# Patient Record
Sex: FEMALE | URBAN - NONMETROPOLITAN AREA
[De-identification: names, ages, dates, MRNs, and addresses within clinical notes are randomized per-mention and may not be internally consistent; named-entity substitution may affect disease eponyms.]

---

## 2023-07-27 ENCOUNTER — OUT OF OFFICE VISIT (OUTPATIENT)
Dept: URBAN - NONMETROPOLITAN AREA MEDICAL CENTER 3 | Facility: MEDICAL CENTER | Age: 78
End: 2023-07-27
Payer: MEDICARE

## 2023-07-27 DIAGNOSIS — K57.33 DIVERTICULITIS LARGE INTESTINE W/O PERFORATION OR ABSCESS W/BLEEDING: ICD-10-CM

## 2023-07-27 DIAGNOSIS — Z79.01 ADEQUATE ANTICOAGULATION ON ANTICOAGULANT THERAPY: ICD-10-CM

## 2023-07-27 PROCEDURE — 99222 1ST HOSP IP/OBS MODERATE 55: CPT | Performed by: PHYSICIAN ASSISTANT

## 2023-07-27 PROCEDURE — 99232 SBSQ HOSP IP/OBS MODERATE 35: CPT | Performed by: PHYSICIAN ASSISTANT

## 2023-07-27 PROCEDURE — G8427 DOCREV CUR MEDS BY ELIG CLIN: HCPCS | Performed by: PHYSICIAN ASSISTANT

## 2023-08-15 ENCOUNTER — WEB ENCOUNTER (OUTPATIENT)
Dept: URBAN - NONMETROPOLITAN AREA CLINIC 4 | Facility: CLINIC | Age: 78
End: 2023-08-15

## 2023-08-18 ENCOUNTER — DASHBOARD ENCOUNTERS (OUTPATIENT)
Age: 78
End: 2023-08-18

## 2023-08-18 ENCOUNTER — OFFICE VISIT (OUTPATIENT)
Dept: URBAN - NONMETROPOLITAN AREA CLINIC 4 | Facility: CLINIC | Age: 78
End: 2023-08-18
Payer: MEDICARE

## 2023-08-18 VITALS
HEART RATE: 76 BPM | TEMPERATURE: 97.5 F | SYSTOLIC BLOOD PRESSURE: 139 MMHG | WEIGHT: 163.4 LBS | HEIGHT: 62 IN | BODY MASS INDEX: 30.07 KG/M2 | DIASTOLIC BLOOD PRESSURE: 78 MMHG

## 2023-08-18 DIAGNOSIS — R10.32 LLQ ABDOMINAL PAIN: ICD-10-CM

## 2023-08-18 DIAGNOSIS — K55.9 ISCHEMIC COLITIS: ICD-10-CM

## 2023-08-18 DIAGNOSIS — K62.5 RECTAL BLEEDING: ICD-10-CM

## 2023-08-18 DIAGNOSIS — D46.C MDS (MYELODYSPLASTIC SYNDROME) WITH 5Q DELETION: ICD-10-CM

## 2023-08-18 PROBLEM — 301716002: Status: ACTIVE | Noted: 2023-08-18

## 2023-08-18 PROBLEM — 12063002: Status: ACTIVE | Noted: 2023-08-18

## 2023-08-18 PROBLEM — 277597005: Status: ACTIVE | Noted: 2023-08-18

## 2023-08-18 PROBLEM — 30588004: Status: ACTIVE | Noted: 2023-08-18

## 2023-08-18 PROCEDURE — 99244 OFF/OP CNSLTJ NEW/EST MOD 40: CPT | Performed by: PHYSICIAN ASSISTANT

## 2023-08-18 PROCEDURE — 99204 OFFICE O/P NEW MOD 45 MIN: CPT | Performed by: PHYSICIAN ASSISTANT

## 2023-08-18 RX ORDER — LISINOPRIL 20 MG/1
TABLET ORAL
Qty: 90 TABLET | Status: ACTIVE | COMMUNITY

## 2023-08-18 RX ORDER — CALCITRIOL 0.25 UG/1
CAPSULE, LIQUID FILLED ORAL
Qty: 39 CAPSULE | Status: ACTIVE | COMMUNITY

## 2023-08-18 RX ORDER — INSULIN ASPART 100 [IU]/ML
INJECTION, SOLUTION INTRAVENOUS; SUBCUTANEOUS
Qty: 30 MILLILITER | Status: ACTIVE | COMMUNITY

## 2023-08-18 RX ORDER — SODIUM, POTASSIUM,MAG SULFATES 17.5-3.13G
177ML SOLUTION, RECONSTITUTED, ORAL ORAL
Qty: 1 | OUTPATIENT
Start: 2023-08-18 | End: 2023-08-20

## 2023-08-18 RX ORDER — ATORVASTATIN CALCIUM 20 MG/1
TABLET, FILM COATED ORAL
Qty: 90 TABLET | Status: ACTIVE | COMMUNITY

## 2023-08-18 RX ORDER — APIXABAN 5 MG/1
TABLET, FILM COATED ORAL
Qty: 60 TABLET | Status: ACTIVE | COMMUNITY

## 2023-08-18 RX ORDER — COPPER GLUCONATE 2 MG
AS DIRECTED TABLET ORAL
Status: ACTIVE | COMMUNITY

## 2023-08-18 RX ORDER — PANTOPRAZOLE SODIUM 40 MG/1
1 TABLET TABLET, DELAYED RELEASE ORAL ONCE A DAY
Status: ACTIVE | COMMUNITY

## 2023-08-18 RX ORDER — EAR PLUGS
1000MCG/ML EACH OTIC (EAR)
Status: ACTIVE | COMMUNITY

## 2023-08-18 RX ORDER — ELECTROLYTES/DEXTROSE
1 TABLET SOLUTION, ORAL ORAL ONCE A DAY
Status: ACTIVE | COMMUNITY

## 2023-08-18 RX ORDER — CHOLECALCIFEROL (VITAMIN D3) 1250 MCG
1 CAPSULE CAPSULE ORAL
Status: ACTIVE | COMMUNITY

## 2023-08-18 NOTE — PHYSICAL EXAM CONSTITUTIONAL:
normal,  alert,  pleasant, well nourished, in no acute distress,  well developed, well nourished,  ambulating with a walker

## 2023-08-18 NOTE — HPI-TODAY'S VISIT:
Michelle Farah is a 77-year-old female patient with PMH of DM, CKD, HTN, GERD, history of DVT on Eliquis, MDS followed by Dr. Moses, who recently was at [facility] on 7/26 for abdominal pain and rectal bleeding.     Initial lab work shows WBC 7.1, hemoglobin 8.5, MCV 90.9, platelets 305k.CT A/P shows: 1.  There is moderate wall thickening involving the mid sigmoid colon with intermixed diverticula.  Adjacent fat stranding within the sigmoid mesocolon noted.  Findings consistent with segmental diverticulitis.   2.  No free peritoneal air nor free fluid is evident.  She presents today for follow up, continues to have some discomfort intermittently to the left lower quadrant but ultimately denies any significant pain like prior to her presentation and she also denies any further rectal bleeding  an extensive review of her hospital chart her hemoglobin was 9 in platelets with 302,000  she tells me that she recently prior to hospitalization received a blood transfusion because her hemoglobin was down to six secondary to her myelodysplastic syndrome

## 2023-09-13 ENCOUNTER — TELEPHONE ENCOUNTER (OUTPATIENT)
Dept: URBAN - METROPOLITAN AREA CLINIC 54 | Facility: CLINIC | Age: 78
End: 2023-09-13

## 2023-11-08 ENCOUNTER — TELEPHONE ENCOUNTER (OUTPATIENT)
Dept: URBAN - METROPOLITAN AREA CLINIC 54 | Facility: CLINIC | Age: 78
End: 2023-11-08